# Patient Record
Sex: FEMALE | Race: BLACK OR AFRICAN AMERICAN | NOT HISPANIC OR LATINO | Employment: FULL TIME | URBAN - METROPOLITAN AREA
[De-identification: names, ages, dates, MRNs, and addresses within clinical notes are randomized per-mention and may not be internally consistent; named-entity substitution may affect disease eponyms.]

---
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
## 2024-02-22 ENCOUNTER — HOSPITAL ENCOUNTER (EMERGENCY)
Facility: MEDICAL CENTER | Age: 24
End: 2024-02-22
Attending: EMERGENCY MEDICINE
Payer: MEDICAID

## 2024-02-22 VITALS
HEART RATE: 89 BPM | OXYGEN SATURATION: 98 % | WEIGHT: 127.87 LBS | SYSTOLIC BLOOD PRESSURE: 126 MMHG | DIASTOLIC BLOOD PRESSURE: 88 MMHG | RESPIRATION RATE: 18 BRPM | BODY MASS INDEX: 23.39 KG/M2 | TEMPERATURE: 98 F

## 2024-02-22 DIAGNOSIS — N75.0 BARTHOLIN CYST: ICD-10-CM

## 2024-02-22 LAB
CANDIDA DNA VAG QL PROBE+SIG AMP: NEGATIVE
G VAGINALIS DNA VAG QL PROBE+SIG AMP: NEGATIVE
HCG SERPL QL: NEGATIVE
HIV 1+2 AB+HIV1 P24 AG SERPL QL IA: NORMAL
T PALLIDUM AB SER QL IA: NORMAL
T VAGINALIS DNA VAG QL PROBE+SIG AMP: NEGATIVE

## 2024-02-22 PROCEDURE — 87491 CHLMYD TRACH DNA AMP PROBE: CPT

## 2024-02-22 PROCEDURE — 36415 COLL VENOUS BLD VENIPUNCTURE: CPT

## 2024-02-22 PROCEDURE — 87660 TRICHOMONAS VAGIN DIR PROBE: CPT

## 2024-02-22 PROCEDURE — 86780 TREPONEMA PALLIDUM: CPT

## 2024-02-22 PROCEDURE — 99284 EMERGENCY DEPT VISIT MOD MDM: CPT

## 2024-02-22 PROCEDURE — 84703 CHORIONIC GONADOTROPIN ASSAY: CPT

## 2024-02-22 PROCEDURE — 87591 N.GONORRHOEAE DNA AMP PROB: CPT

## 2024-02-22 PROCEDURE — 87510 GARDNER VAG DNA DIR PROBE: CPT

## 2024-02-22 PROCEDURE — 87480 CANDIDA DNA DIR PROBE: CPT

## 2024-02-22 PROCEDURE — 87389 HIV-1 AG W/HIV-1&-2 AB AG IA: CPT

## 2024-02-23 LAB
C TRACH DNA SPEC QL NAA+PROBE: NEGATIVE
N GONORRHOEA DNA SPEC QL NAA+PROBE: NEGATIVE
SPECIMEN SOURCE: NORMAL

## 2024-02-23 NOTE — ED TRIAGE NOTES
"Pt ambulatory to triage c/o non painful \"lump\" to inside of labia and states it has been there for \"awhile\" pt also requesting to be tested for STI because her partner told her he has HPV or Herpes pt is not clear on which. STI protocol ordered  "

## 2024-02-23 NOTE — ED PROVIDER NOTES
ED Provider Note    CHIEF COMPLAINT  Chief Complaint   Patient presents with    Lump    Other       EXTERNAL RECORDS REVIEWED      HPI/ROS  LIMITATION TO HISTORY     OUTSIDE HISTORIAN(S):      Servando Baxter is a 23 y.o. female who presents to the emergency department with chief complaint of labial swelling.  Patient reports that she has had a bump on the left labia for what she reports is 6 months.  She reports that it fluctuates in size and that it often gets bigger during intercourse.  She reports that it is not painful she has had no redness no swelling no fevers no chills she reports that she has had increased discharge but reports that it is clear and not foul-smelling.  She denies chance of pregnancy at this time.  She reports that her boyfriend told her that he may have HPV or herpes she is not sure which of these.    PAST MEDICAL HISTORY   None    SURGICAL HISTORY  patient denies any surgical history    FAMILY HISTORY  No family history on file.    SOCIAL HISTORY  Social History     Tobacco Use    Smoking status: Every Day    Smokeless tobacco: Never   Vaping Use    Vaping Use: Never used   Substance and Sexual Activity    Alcohol use: Not Currently    Drug use: Never    Sexual activity: Not on file       CURRENT MEDICATIONS  Home Medications       Reviewed by Gretchen Brunson R.N. (Registered Nurse) on 02/22/24 at 1730  Med List Status: Not Addressed     Medication Last Dose Status   amoxicillin (AMOXIL) 500 MG Cap  Active                    ALLERGIES  Allergies   Allergen Reactions    Peanut-Derived        PHYSICAL EXAM  VITAL SIGNS: /89   Pulse 94   Temp 36.7 °C (98.1 °F) (Temporal)   Resp 18   Wt 58 kg (127 lb 13.9 oz)   LMP 02/16/2024 (Approximate)   SpO2 98%   BMI 23.39 kg/m²    Pulse ox interpretation: I interpret this pulse ox as normal.  Constitutional: Alert and oriented x 3, minimal Distress  HEENT: Atraumatic normocephalic, pupils are equal round reactive to light extraocular  movements are intact. The nares is clear, external ears are normal, mouth shows moist mucous membranes normal dentition for age  Neck: Supple, no JVD no tracheal deviation  Cardiovascular: Regular rate and rhythm no murmur rub or gallop 2+ pulses peripherally x4  Thorax & Lungs: No respiratory distress, no wheezes rales or rhonchi, No chest tenderness.   GI: Soft nontender nondistended positive bowel sounds, no peritoneal signs  : Patient has large swelling on the left labia just on the introitus of the vagina.  Fluctuant no erythema no warmth no active drainage, minimal physiologic appearing discharge within the vaginal vault no cervical motion or adnexal motion tenderness  Skin: Warm dry no acute rash or lesion  Musculoskeletal: Moving all extremities with full range and 5 of 5 strength no acute  deformity  Neurologic: Cranial nerves III through XII are grossly intact no sensory deficit no cerebellar dysfunction   Psychiatric: Appropriate affect for situation at this time          DIAGNOSTIC STUDIES / PROCEDURES:  Results for orders placed or performed during the hospital encounter of 02/22/24   Chlamydia/GC, PCR (Urine)    Specimen: Urine   Result Value Ref Range    Source Urine    T.PALLIDUM AB MELANIE (Syphilis)   Result Value Ref Range    Syphilis, Treponemal Qual Non-Reactive Non-Reactive   HIV AG/AB Combo Assay Screening   Result Value Ref Range    HIV Ag/Ab Combo Assay Non-Reactive Non Reactive   HCG QUAL SERUM   Result Value Ref Range    Beta-Hcg Qualitative Serum Negative Negative   VAGINAL PATHOGENS DNA PANEL   Result Value Ref Range    Candida species DNA Probe Negative Negative    Trichamonas vaginalis DNA Probe Negative Negative    Gardnerella vaginalis DNA Probe Negative Negative           COURSE & MEDICAL DECISION MAKING    ED Observation Status? No; Patient does not meet criteria for ED Observation.     INITIAL ASSESSMENT, COURSE AND PLAN  Care Narrative: Patient presents with complaint of bump on  her labia that is been there for 6 months.  History as well as physical support Bartholin cyst in this area.  I discussed drainage procedure and drain placement with patient at length.  Patient is very hesitant to perform this procedure in the ER [facility].  This Bartholin cyst is minimal.  However she does report that it gets bigger with intercourse again supporting the diagnosis.  After fully discussing the indications and the procedure of draining the cyst patient is opted not to do this at this time.  Patient is given instructions to follow-up with [facility] at the next available time to return here for worsening pain swelling drainage any other acute symptom changes or concerns.  Informed that all of her STD testing would be available through [facility] and that if any of these are positive she will be notified and if any of these tests require acute intervention I will also call her.  Patient discharged in stable condition.      ADDITIONAL PROBLEM LIST    DISPOSITION AND DISCUSSIONS    I have discussed management of the patient with the following physicians and ALLY's:      Discussion of management with other [facility] or appropriate source(s):      Escalation of care considered, and ultimately not performed:    Barriers to care at this time, including but not limited to: .     Decision tools and prescription drugs considered including, but not limited to: .  /88   Pulse 89   Temp 36.7 °C (98 °F) (Temporal)   Resp 18   Wt 58 kg (127 lb 13.9 oz)   LMP 02/16/2024 (Approximate)   SpO2 98%   BMI 23.39 kg/m²     [facility]  [address]  [place] [address] [phone]  Schedule an appointment as soon as possible for a visit       [facility], Emergency Dept  [address] [address] [phone]    in 12-24 hours if symptoms persist, immediately If symptoms worsen, or if you develop any other symptoms or  concerns      FINAL DIAGNOSIS  1. Bartholin cyst Active          Electronically signed by: Prudencio Mccarthy M.D.

## 2024-02-23 NOTE — ED NOTES
Discharge instructions given to pt. Prescriptions unchanged. Pt educated, verbalizes understanding. All belongings accounted for. Pt will ambulate out of ED with steady gait once dressed.

## 2024-03-11 ENCOUNTER — HOSPITAL ENCOUNTER (OUTPATIENT)
Facility: MEDICAL CENTER | Age: 24
End: 2024-03-11
Attending: OBSTETRICS & GYNECOLOGY
Payer: MEDICAID

## 2024-03-11 ENCOUNTER — GYNECOLOGY VISIT (OUTPATIENT)
Dept: OBGYN | Facility: CLINIC | Age: 24
End: 2024-03-11
Payer: MEDICAID

## 2024-03-11 VITALS
HEIGHT: 63 IN | DIASTOLIC BLOOD PRESSURE: 48 MMHG | SYSTOLIC BLOOD PRESSURE: 90 MMHG | BODY MASS INDEX: 23.18 KG/M2 | WEIGHT: 130.8 LBS

## 2024-03-11 DIAGNOSIS — Z30.09 GENERAL COUNSELING AND ADVICE FOR CONTRACEPTIVE MANAGEMENT: ICD-10-CM

## 2024-03-11 DIAGNOSIS — N75.0 BARTHOLIN'S GLAND CYST: ICD-10-CM

## 2024-03-11 DIAGNOSIS — Z12.4 CERVICAL CANCER SCREENING: ICD-10-CM

## 2024-03-11 PROCEDURE — 3078F DIAST BP <80 MM HG: CPT | Performed by: OBSTETRICS & GYNECOLOGY

## 2024-03-11 PROCEDURE — 88142 CYTOPATH C/V THIN LAYER: CPT

## 2024-03-11 PROCEDURE — 99204 OFFICE O/P NEW MOD 45 MIN: CPT | Performed by: OBSTETRICS & GYNECOLOGY

## 2024-03-11 PROCEDURE — 3074F SYST BP LT 130 MM HG: CPT | Performed by: OBSTETRICS & GYNECOLOGY

## 2024-03-11 RX ORDER — AMOXICILLIN AND CLAVULANATE POTASSIUM 500; 125 MG/1; MG/1
1 TABLET, FILM COATED ORAL 3 TIMES DAILY
Qty: 30 TABLET | Refills: 0 | Status: SHIPPED | OUTPATIENT
Start: 2024-03-11 | End: 2024-03-21
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
## 2024-03-18 LAB
CYTOLOGIST CVX/VAG CYTO: NORMAL
CYTOLOGY CVX/VAG DOC CYTO: NORMAL
NOTE NL11727A: NORMAL
OTHER STN SPEC: NORMAL
QC REVIEWED BY NL11722A: NORMAL
STAT OF ADQ CVX/VAG CYTO-IMP: NORMAL

## 2024-03-19 ENCOUNTER — TELEPHONE (OUTPATIENT)
Dept: OBGYN | Facility: CLINIC | Age: 24
End: 2024-03-19
Payer: MEDICAID
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
## 2024-03-25 ENCOUNTER — GYNECOLOGY VISIT (OUTPATIENT)
Dept: OBGYN | Facility: CLINIC | Age: 24
End: 2024-03-25
Payer: MEDICAID

## 2024-03-25 VITALS — DIASTOLIC BLOOD PRESSURE: 64 MMHG | BODY MASS INDEX: 22.86 KG/M2 | WEIGHT: 127 LBS | SYSTOLIC BLOOD PRESSURE: 94 MMHG

## 2024-03-25 DIAGNOSIS — Z72.51 RISK FOR SEXUALLY TRANSMITTED DISEASE: ICD-10-CM

## 2024-03-25 PROCEDURE — 3078F DIAST BP <80 MM HG: CPT | Performed by: NURSE PRACTITIONER

## 2024-03-25 PROCEDURE — 3074F SYST BP LT 130 MM HG: CPT | Performed by: NURSE PRACTITIONER

## 2024-03-25 PROCEDURE — 99212 OFFICE O/P EST SF 10 MIN: CPT | Performed by: NURSE PRACTITIONER
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
## 2024-04-23 ENCOUNTER — OFFICE VISIT (OUTPATIENT)
Dept: URGENT CARE | Facility: CLINIC | Age: 24
End: 2024-04-23
Payer: MEDICAID

## 2024-04-23 VITALS
RESPIRATION RATE: 16 BRPM | BODY MASS INDEX: 22.71 KG/M2 | SYSTOLIC BLOOD PRESSURE: 126 MMHG | OXYGEN SATURATION: 100 % | DIASTOLIC BLOOD PRESSURE: 78 MMHG | HEIGHT: 63 IN | WEIGHT: 128.2 LBS | HEART RATE: 62 BPM | TEMPERATURE: 97.1 F

## 2024-04-23 DIAGNOSIS — H60.503 ACUTE OTITIS EXTERNA OF BOTH EARS, UNSPECIFIED TYPE: ICD-10-CM

## 2024-04-23 DIAGNOSIS — H72.91 PERFORATION OF RIGHT TYMPANIC MEMBRANE: ICD-10-CM

## 2024-04-23 PROCEDURE — 99204 OFFICE O/P NEW MOD 45 MIN: CPT | Performed by: REGISTERED NURSE

## 2024-04-23 PROCEDURE — 3074F SYST BP LT 130 MM HG: CPT | Performed by: REGISTERED NURSE

## 2024-04-23 PROCEDURE — 3078F DIAST BP <80 MM HG: CPT | Performed by: REGISTERED NURSE

## 2024-04-23 RX ORDER — OFLOXACIN 3 MG/ML
10 SOLUTION AURICULAR (OTIC) DAILY
Qty: 14 ML | Refills: 0 | Status: SHIPPED | OUTPATIENT
Start: 2024-04-23 | End: 2024-04-30

## 2024-04-23 ASSESSMENT — ENCOUNTER SYMPTOMS
CHILLS: 0
FEVER: 0
EYE PAIN: 0
DIZZINESS: 0
COUGH: 0
SORE THROAT: 0

## 2024-04-23 NOTE — PROGRESS NOTES
"Subjective:   Servando Baxter is a 24 y.o. female who presents for Otalgia (Right ear x4 months got something stuck removed it but ear is starting to ache.)      HPI  4 months ago was cleaning out her right ear, used a cleaning kit that had a device to insert into ear to clean cerumen. Developed pain during this cleaning procedure, has continued to clean with various products. Occassional brownish tinged drainaged from the ear. No recent illness. Muffled hearing on right    Review of Systems   Constitutional:  Negative for chills and fever.   HENT:  Negative for sore throat.    Eyes:  Negative for pain.   Respiratory:  Negative for cough.    Cardiovascular:  Negative for chest pain.   Neurological:  Negative for dizziness.       Allergies   Allergen Reactions    Peanut-Derived        There are no problems to display for this patient.      Current Outpatient Medications Ordered in Epic   Medication Sig Dispense Refill    ofloxacin otic sol (FLOXIN OTIC) 0.3 % Solution Administer 10 Drops into affected ear(s) every day for 7 days. 14 mL 0    amoxicillin (AMOXIL) 500 MG Cap Take 1 Capsule by mouth 3 times a day. (Patient not taking: Reported on 4/23/2024) 30 Capsule 0     No current Epic-ordered facility-administered medications on file.       No past surgical history on file.    Social History     Tobacco Use    Smoking status: Former     Types: Cigarettes    Smokeless tobacco: Never   Vaping Use    Vaping Use: Some days    Substances: Flavoring   Substance Use Topics    Alcohol use: Not Currently    Drug use: Not Currently     Types: Marijuana     Comment: last used marijunana in 2022       family history includes No Known Problems in her father, mother, and sister.     Problem list, medications, and allergies reviewed by myself today in Epic.     Objective:   /78   Pulse 62   Temp 36.2 °C (97.1 °F) (Temporal)   Resp 16   Ht 1.6 m (5' 3\")   Wt 58.2 kg (128 lb 3.2 oz)   LMP 03/11/2024 (Approximate)   SpO2 " 100%   BMI 22.71 kg/m²     Physical Exam  Vitals and nursing note reviewed.   Constitutional:       Appearance: Normal appearance. She is not ill-appearing or toxic-appearing.   HENT:      Head: Normocephalic.      Right Ear: Drainage, swelling and tenderness present. No middle ear effusion. Tympanic membrane is perforated. Tympanic membrane is not erythematous.      Left Ear: Swelling and tenderness present.  No middle ear effusion. Tympanic membrane is not erythematous.      Ears:      Comments: Right tragal tenderness.  Bilateral canal swelling     Mouth/Throat:      Mouth: Mucous membranes are moist.   Eyes:      Pupils: Pupils are equal, round, and reactive to light.   Cardiovascular:      Rate and Rhythm: Normal rate and regular rhythm.      Heart sounds: No murmur heard.  Pulmonary:      Effort: Pulmonary effort is normal. No respiratory distress.      Breath sounds: Normal breath sounds.   Abdominal:      General: Abdomen is flat.      Palpations: Abdomen is soft.   Musculoskeletal:         General: Normal range of motion.      Cervical back: Normal range of motion.   Skin:     General: Skin is warm and dry.      Capillary Refill: Capillary refill takes less than 2 seconds.      Findings: No rash.   Neurological:      General: No focal deficit present.      Mental Status: She is alert and oriented to person, place, and time.      Cranial Nerves: No cranial nerve deficit.   Psychiatric:         Mood and Affect: Mood normal.         Assessment/Plan:     I personally reviewed prior external notes and test results pertinent to today's visit as well as additional imaging and testing completed in clinic today.     1. Acute otitis externa of both ears, unspecified type  ofloxacin otic sol (FLOXIN OTIC) 0.3 % Solution    Referral to ENT      2. Perforation of right tympanic membrane  Referral to ENT        Very pleasant 24-year-old female presenting for evaluation of bilateral ear symptoms but the right is worse.   Stuck a cleaning device in the right ear which caused immediate pain and since then has had intermittent brownish colored drainage on pillowcase for 3 to 4 months.  No recent illness.  Has tried various things to clean the ear since this event including peroxide.  Vitals reassuring.  On exam there is bilateral ear canal swelling with tenderness, dried drainage in the right canal does appear there is a perforation on the right TM.  No middle ear effusions.  Remainder of exam unremarkable.  Discussed findings with patient will place on ofloxacin eardrops for otitis externa, she is given TM perforation precautions and we discussed not using anything to clean the ears including peroxide.  Referral placed to ENT as it seems this has not resolved given continued drainage and pain.    Shared decision-making was utilized with patient for treatment plan. Differential Diagnosis, natural history, and supportive care discussed.     Medication discussed included indication for use and the potential benefits and side effects. Education was provided regarding the aforementioned assessments. All of the patient's questions were answered to their satisfaction at the time of discharge. Patient was encouraged to monitor symptoms closely. Those signs and symptoms which would warrant concern and mandate seeking a higher level of service through the emergency department discussed at length. Patient stated agreement and understanding of this plan of care.     Please note that this dictation was created using voice recognition software. I have made every reasonable attempt to correct obvious errors, but I expect that there are errors of grammar and possibly content that I did not discover before finalizing the note.    This note was electronically signed by SELENE Bonilla

## 2024-05-04 ENCOUNTER — HOSPITAL ENCOUNTER (EMERGENCY)
Facility: MEDICAL CENTER | Age: 24
End: 2024-05-04
Attending: EMERGENCY MEDICINE
Payer: MEDICAID

## 2024-05-04 VITALS
RESPIRATION RATE: 16 BRPM | TEMPERATURE: 99.1 F | HEART RATE: 75 BPM | DIASTOLIC BLOOD PRESSURE: 61 MMHG | WEIGHT: 123.9 LBS | SYSTOLIC BLOOD PRESSURE: 107 MMHG | HEIGHT: 62 IN | OXYGEN SATURATION: 97 % | BODY MASS INDEX: 22.8 KG/M2

## 2024-05-04 DIAGNOSIS — U07.1 COVID-19: ICD-10-CM

## 2024-05-04 LAB
FLUAV RNA SPEC QL NAA+PROBE: NEGATIVE
FLUBV RNA SPEC QL NAA+PROBE: NEGATIVE
RSV RNA SPEC QL NAA+PROBE: NEGATIVE
S PYO DNA SPEC NAA+PROBE: NOT DETECTED
SARS-COV-2 RNA RESP QL NAA+PROBE: DETECTED

## 2024-05-04 RX ORDER — ACETAMINOPHEN 325 MG/1
650 TABLET ORAL ONCE
Status: COMPLETED | OUTPATIENT
Start: 2024-05-04 | End: 2024-05-04

## 2024-05-04 RX ORDER — IBUPROFEN 600 MG/1
600 TABLET ORAL ONCE
Status: COMPLETED | OUTPATIENT
Start: 2024-05-04 | End: 2024-05-04

## 2024-05-04 RX ORDER — SODIUM CHLORIDE 9 MG/ML
1000 INJECTION, SOLUTION INTRAVENOUS ONCE
Status: COMPLETED | OUTPATIENT
Start: 2024-05-04 | End: 2024-05-04

## 2024-05-04 RX ADMIN — IBUPROFEN 600 MG: 600 TABLET, FILM COATED ORAL at 11:55

## 2024-05-04 RX ADMIN — SODIUM CHLORIDE 1000 ML: 9 INJECTION, SOLUTION INTRAVENOUS at 11:20

## 2024-05-04 RX ADMIN — ACETAMINOPHEN 650 MG: 325 TABLET, FILM COATED ORAL at 11:55

## 2024-05-04 ASSESSMENT — PAIN DESCRIPTION - PAIN TYPE: TYPE: ACUTE PAIN

## 2024-05-04 NOTE — DISCHARGE INSTRUCTIONS
You were seen in the ER for sore throat and cough.  Unfortunately you have COVID-19.  There is no treatment for this, instead I recommend symptom management such as Tylenol and ibuprofen, rest, and fluids.  Follow-up with a primary care physician and return to the ER with any new or worsening symptoms.  I hope you feel better soon!

## 2024-05-04 NOTE — ED PROVIDER NOTES
"ED Provider Note    CHIEF COMPLAINT  Chief Complaint   Patient presents with    Fever     On/off x3days    Sore Throat     +swelling    Cough       EXTERNAL RECORDS REVIEWED  Outpatient Notes seen in urgent care 4/23/2024 for right ear ache.  Patient was started on ofloxacin eardrops for otitis externa.    HPI/ROS  LIMITATION TO HISTORY   Select: : None  OUTSIDE HISTORIAN(S):  None    Servando Baxter is a 24 y.o. female who presents with a chief complaint of sore throat, cough, and fever as high as 99 °F.  Her symptoms started about 3 to 4 days ago.  She has been taking ibuprofen with transient improvement.  She denies chest pain or shortness of breath.    PAST MEDICAL HISTORY   has a past medical history of Anxiety, Depression, and Migraine.    SURGICAL HISTORY  patient denies any surgical history    FAMILY HISTORY  Family History   Problem Relation Age of Onset    No Known Problems Mother     No Known Problems Father     No Known Problems Sister        SOCIAL HISTORY  Social History     Tobacco Use    Smoking status: Former     Types: Cigarettes    Smokeless tobacco: Never   Vaping Use    Vaping Use: Some days    Substances: Flavoring   Substance and Sexual Activity    Alcohol use: Not Currently    Drug use: Not Currently     Types: Marijuana     Comment: last used marijunana in 2022    Sexual activity: Not Currently     Partners: Male     Birth control/protection: None       CURRENT MEDICATIONS  Home Medications       Reviewed by Linda Prater R.N. (Registered Nurse) on 05/04/24 at 1009  Med List Status: Not Addressed     Medication Last Dose Status        Patient Kush Taking any Medications                           ALLERGIES  Allergies   Allergen Reactions    Peanut-Derived        PHYSICAL EXAM  VITAL SIGNS: /77   Pulse (!) 101   Temp 37.7 °C (99.8 °F) (Temporal)   Resp 16   Ht 1.575 m (5' 2\")   Wt 56.2 kg (123 lb 14.4 oz)   SpO2 97%   BMI 22.66 kg/m²    Physical Exam  Vitals and nursing note " reviewed.   Constitutional:       Appearance: She is well-developed.   HENT:      Head: Normocephalic and atraumatic.      Mouth/Throat:      Mouth: Mucous membranes are dry.      Pharynx: Oropharynx is clear.      Tonsils: No tonsillar exudate or tonsillar abscesses. 0 on the right. 0 on the left.   Eyes:      Conjunctiva/sclera: Conjunctivae normal.      Pupils: Pupils are equal, round, and reactive to light.   Cardiovascular:      Rate and Rhythm: Normal rate and regular rhythm.      Heart sounds: Normal heart sounds.   Pulmonary:      Effort: Pulmonary effort is normal.      Breath sounds: Normal breath sounds.   Skin:     General: Skin is warm and dry.   Neurological:      General: No focal deficit present.      Mental Status: She is alert and oriented to person, place, and time.   Psychiatric:         Mood and Affect: Mood normal.         Behavior: Behavior normal.       EKG/LABS  Results for orders placed or performed during the hospital encounter of 05/04/24   Group A Strep by PCR    Specimen: Throat   Result Value Ref Range    Group A Strep by PCR Not Detected Not Detected   POC CoV-2, FLU A/B, RSV by PCR   Result Value Ref Range    POC Influenza A RNA, PCR Negative Negative    POC Influenza B RNA, PCR Negative Negative    POC RSV, by PCR Negative Negative    POC SARS-CoV-2, PCR DETECTED (AA)      I have independently interpreted this EKG    RADIOLOGY/PROCEDURES   I have independently interpreted the diagnostic imaging associated with this visit and am waiting the final reading from the radiologist.   My preliminary interpretation is as follows: N/A    Radiologist interpretation:  No orders to display     COURSE & MEDICAL DECISION MAKING    ASSESSMENT, COURSE AND PLAN  Care Narrative: This is a 24 year old female here with cough, sore throat, and tactile fever.    DDx includes, but is not limited to, viral syndrome, pneumonia, PTA, RPA, strep pharyngitis.    Arrives to triage tachycardic with otherwise  normal VS. At the time of my evaluation her HR had improved to normal without intervention. She appears dehydrated with dry mucous membranes but non-toxic. She is alert, interactive, oriented. There is no trismus. There is no peritonsillar fullness or uvular deviation, low suspicion for PTA. Her neck is supple, she has no muffling of her voice, she is not toxic appearing. Low suspicion for RPA. She is tolerating her own secretions and there is no stridor. No indicators that the airway is involved. Strep test was obtained and is negative. She was given tylenol and ibuprofen and started on IV fluids for dehydration. Unfortunately her COVID-19 test is positive which is likely the etiology of her symptoms. No indication for antivirals. We went over natural course of viral infections and I recommended symptomatic management. Discharged in good and stable condition with strict return precautions.    Hydration: Based on the patient's presentation of Dehydration the patient was given IV fluids. IV Hydration was used because oral hydration was not adequate alone. Upon recheck following hydration, the patient was improved.    ADDITIONAL PROBLEMS MANAGED  N/A    DISPOSITION AND DISCUSSIONS  I have discussed management of the patient with the following physicians and ALLY's:  None    Discussion of management with other QHP or appropriate source(s): None     Escalation of care considered, and ultimately not performed:Laboratory analysis and diagnostic imaging    Barriers to care at this time, including but not limited to: Patient does not have established PCP.     Decision tools and prescription drugs considered including, but not limited to:  N/A .    FINAL DIAGNOSIS  1. COVID-19      Electronically signed by: Tommy Corey M.D., 5/4/2024 10:46 AM

## 2024-05-04 NOTE — ED NOTES
Chief Complaint   Patient presents with    Fever     On/off x3days    Sore Throat     +swelling    Cough     Pt ambulated to triage with above complaints. Nad, speaking full sentences, no drooling noted.   Respiratory swab collected and processed  Throat swab sent to lab

## 2024-05-04 NOTE — ED NOTES
18G R AC established; patient moved to new room due to ISO status; receiving RN given report; Fluids running; no other needs at this time.

## 2024-05-04 NOTE — ED NOTES
Discharge instructions given to pt. Prescriptions unchanged. Pt educated, verbalizes understanding. All belongings accounted for. Pt ambulated out of ED with steady gait to go home. PIV removed and dressing applied.

## 2024-05-04 NOTE — ED NOTES
Patient Identifiers verified; patient ambulated to room with a steady gait; charted the Patient for an Emergency Room Physician to see.    Requested possible referral to ENT.